# Patient Record
Sex: FEMALE | Race: WHITE
[De-identification: names, ages, dates, MRNs, and addresses within clinical notes are randomized per-mention and may not be internally consistent; named-entity substitution may affect disease eponyms.]

---

## 2018-03-12 ENCOUNTER — HOSPITAL ENCOUNTER (OUTPATIENT)
Dept: HOSPITAL 80 - F3N | Age: 83
Setting detail: OBSERVATION
LOS: 4 days | Discharge: HOME HEALTH | End: 2018-03-16
Attending: ORTHOPAEDIC SURGERY | Admitting: ORTHOPAEDIC SURGERY
Payer: COMMERCIAL

## 2018-03-12 DIAGNOSIS — T84.033A: Primary | ICD-10-CM

## 2018-03-12 DIAGNOSIS — Z96.651: ICD-10-CM

## 2018-03-12 PROCEDURE — 97161 PT EVAL LOW COMPLEX 20 MIN: CPT

## 2018-03-12 PROCEDURE — 27487 REVISE/REPLACE KNEE JOINT: CPT

## 2018-03-12 PROCEDURE — 97110 THERAPEUTIC EXERCISES: CPT

## 2018-03-12 PROCEDURE — 73560 X-RAY EXAM OF KNEE 1 OR 2: CPT

## 2018-03-12 PROCEDURE — 97165 OT EVAL LOW COMPLEX 30 MIN: CPT

## 2018-03-12 PROCEDURE — C1713 ANCHOR/SCREW BN/BN,TIS/BN: HCPCS

## 2018-03-15 PROCEDURE — 0SRD0J9 REPLACEMENT OF LEFT KNEE JOINT WITH SYNTHETIC SUBSTITUTE, CEMENTED, OPEN APPROACH: ICD-10-PCS | Performed by: ORTHOPAEDIC SURGERY

## 2018-03-15 PROCEDURE — 0SPD0JZ REMOVAL OF SYNTHETIC SUBSTITUTE FROM LEFT KNEE JOINT, OPEN APPROACH: ICD-10-PCS | Performed by: ORTHOPAEDIC SURGERY

## 2018-03-15 RX ADMIN — ACETAMINOPHEN SCH MG: 325 TABLET ORAL at 16:59

## 2018-03-15 RX ADMIN — TRANEXAMIC ACID SCH MG: 650 TABLET ORAL at 21:02

## 2018-03-15 RX ADMIN — OXYCODONE HYDROCHLORIDE PRN MG: 15 TABLET ORAL at 18:32

## 2018-03-15 RX ADMIN — OXYCODONE HYDROCHLORIDE PRN MG: 15 TABLET ORAL at 17:01

## 2018-03-15 RX ADMIN — Medication SCH MLS: at 21:03

## 2018-03-15 RX ADMIN — FAMOTIDINE SCH MG: 20 TABLET, FILM COATED ORAL at 17:01

## 2018-03-15 RX ADMIN — DOCUSATE SODIUM AND SENNOSIDES SCH TAB: 50; 8.6 TABLET ORAL at 21:02

## 2018-03-15 NOTE — PDANEPAE
ANE History of Present Illness





LTKA





ANE Past Medical History





- Cardiovascular History


Hx Hypertension: No


Hx Arrhythmias: No


Hx Chest Pain: No


Hx Coronary Artery / Peripheral Vascular Disease: No


Hx CHF / Valvular Disease: No


Hx Palpitations: No





- Pulmonary History


Hx COPD: No


Hx Asthma/Reactive Airway Disease: No


Hx Recent Upper Respiratory Infection: No


Hx Oxygen in Use at Home: No


Hx Sleep Apnea: No


Sleep Apnea Screening Result - Last Documented: Negative





- Neurologic History


Hx Cerebrovascular Accident: No


Hx Seizures: No


Hx Dementia: No





- Endocrine History


Hx Diabetes: No





- Renal History


Hx Renal Disorders: No





- Liver History


Hx Hepatic Disorders: No





- Neurological & Psychiatric Hx


Hx Neurological and Psychiatric Disorders: No





- Cancer History


Hx Cancer: No





- Congenital Disorder History


Hx Congenital Disorders: No





- GI History


Hx Gastrointestinal Disorders: Yes


Gastrointestinal History Comment: hx of colonoscopies.  occ constipation





- Other Health History


Other Health History: wears glasses/ contacts.  wears hearing aides bilaterally





- Chronic Pain History


Chronic Pain: Yes (left knee, occ back pain)





- Surgical History


Prior Surgeries: appy.  previous left TKA in 2008.  right tka 2006.  bunion/ 

neuroma surgery bilateral 1997.  ligament repair to foot 2000.  colonoscopy





ANE Review of Systems


Review of Systems: 








- Exercise capacity


METS (RN): 4 METS





ANE Patient History





- Allergies


Allergies/Adverse Reactions: 








morphine Allergy (Verified 02/08/18 10:20)


 nausea and weakness








- Home Medications


Home Medications: 








Ferrous Sulfate [Ferrous Sulf 325 MG (*)] 325 mg PO DAILY 02/01/18 [Last Taken 

Unknown]


Herbals/Supplements -Info Only 1 ea PO DAILY 02/01/18 [Last Taken Unknown]


Magnesium Oxide [Magnesium Oxide 400 mg (*)] 400 mg PO DAILY 02/01/18 [Last 

Taken Unknown]


Naproxen Sodium [Aleve 220 MG (*)] 220 mg PO DAILY PRN 02/01/18 [Last Taken 

Unknown]


Tetrahydrozoline 0.05% [Visine (*)] 1 drop EACHEYE DAILY PRN 02/01/18 [Last 

Taken Unknown]








- NPO status


NPO Since - Liquids (Date): 03/14/18


NPO Since - Liquids (Time): 22:00


NPO Since - Solids (Date): 03/14/18


NPO Since - Solids (Time): 17:30





- Smoking Hx


Smoking Status: Never smoked





- Family Anes Hx


Family Hx Anesthesia Complications: none





ANE Labs/Vital Signs





- Vital Signs


Blood Pressure: 145/82


Heart Rate: 56


Respiratory Rate: 14


O2 Sat (%): 95


Height: 162.56 cm


Weight: 65.77 kg





ANE Anesthesia Plan


Anesthesia Plan: spinal


Total IV Anesthesia: Yes

## 2018-03-15 NOTE — PDANEPAE
ANE History of Present Illness





L TKA





ANE Past Medical History





- Cardiovascular History


Hx Hypertension: No


Hx Arrhythmias: No


Hx Chest Pain: No


Hx Coronary Artery / Peripheral Vascular Disease: No


Hx CHF / Valvular Disease: No


Hx Palpitations: No





- Pulmonary History


Hx COPD: No


Hx Asthma/Reactive Airway Disease: No


Hx Recent Upper Respiratory Infection: No


Hx Oxygen in Use at Home: No


Hx Sleep Apnea: No


Sleep Apnea Screening Result - Last Documented: Negative





- Neurologic History


Hx Cerebrovascular Accident: No


Hx Seizures: No


Hx Dementia: No





- Endocrine History


Hx Diabetes: No





- Renal History


Hx Renal Disorders: No





- Liver History


Hx Hepatic Disorders: No





- Neurological & Psychiatric Hx


Hx Neurological and Psychiatric Disorders: No





- Cancer History


Hx Cancer: No





- Congenital Disorder History


Hx Congenital Disorders: No





- GI History


Hx Gastrointestinal Disorders: Yes


Gastrointestinal History Comment: hx of colonoscopies.  occ constipation





- Other Health History


Other Health History: wears glasses/ contacts.  wears hearing aides bilaterally





- Chronic Pain History


Chronic Pain: Yes (left knee, occ back pain)





- Surgical History


Prior Surgeries: appy.  previous left TKA in 2008.  right tka 2006.  bunion/ 

neuroma surgery bilateral 1997.  ligament repair to foot 2000.  colonoscopy





ANE Review of Systems


Review of systems is: negative


Review of Systems: 








- Exercise capacity


METS (RN): 4 METS





ANE Patient History





- Allergies


Allergies/Adverse Reactions: 








morphine Allergy (Verified 02/08/18 10:20)


 nausea and weakness








- Home Medications


Home medications: home medication list seen and reviewed


Home Medications: 








Ferrous Sulfate [Ferrous Sulf 325 MG (*)] 325 mg PO DAILY 02/01/18 [Last Taken 

Unknown]


Herbals/Supplements -Info Only 1 ea PO DAILY 02/01/18 [Last Taken Unknown]


Magnesium Oxide [Magnesium Oxide 400 mg (*)] 400 mg PO DAILY 02/01/18 [Last 

Taken Unknown]


Naproxen Sodium [Aleve 220 MG (*)] 220 mg PO DAILY PRN 02/01/18 [Last Taken 

Unknown]


Tetrahydrozoline 0.05% [Visine (*)] 1 drop EACHEYE DAILY PRN 02/01/18 [Last 

Taken Unknown]








- Anes Hx


Anes Hx: post operative nausea





- Smoking Hx


Smoking Status: Never smoked





- Family Anes Hx


Family Anes Hx: none


Family Hx Anesthesia Complications: none





ANE Labs/Vital Signs





- Vital Signs


Blood Pressure: 145/82


Heart Rate: 56


Respiratory Rate: 14


O2 Sat (%): 95


Height: 162.56 cm


Weight: 65.77 kg





ANE Physical Exam





- Airway


Neck exam: FROM


Mallampati Score: Class 2


Mouth exam: normal dental/mouth exam





- Pulmonary


Pulmonary: no respiratory distress





- Cardiovascular


Cardiovascular: regular rate and rhythym





- ASA Status


ASA Status: II





ANE Anesthesia Plan


Anesthesia Plan: GA w LMA

## 2018-03-16 VITALS
TEMPERATURE: 97.7 F | HEART RATE: 76 BPM | DIASTOLIC BLOOD PRESSURE: 75 MMHG | SYSTOLIC BLOOD PRESSURE: 140 MMHG | OXYGEN SATURATION: 93 %

## 2018-03-16 VITALS — RESPIRATION RATE: 16 BRPM

## 2018-03-16 RX ADMIN — ASPIRIN SCH MG: 325 TABLET, FILM COATED ORAL at 09:38

## 2018-03-16 RX ADMIN — FAMOTIDINE SCH MG: 20 TABLET, FILM COATED ORAL at 09:38

## 2018-03-16 RX ADMIN — Medication SCH MLS: at 04:37

## 2018-03-16 RX ADMIN — ACETAMINOPHEN SCH: 325 TABLET ORAL at 04:38

## 2018-03-16 RX ADMIN — ACETAMINOPHEN SCH MG: 325 TABLET ORAL at 04:38

## 2018-03-16 RX ADMIN — TRANEXAMIC ACID SCH MG: 650 TABLET ORAL at 12:24

## 2018-03-16 RX ADMIN — TRANEXAMIC ACID SCH MG: 650 TABLET ORAL at 04:39

## 2018-03-16 RX ADMIN — ACETAMINOPHEN SCH MG: 325 TABLET ORAL at 12:23

## 2018-03-16 RX ADMIN — OXYCODONE HYDROCHLORIDE PRN MG: 15 TABLET ORAL at 09:53

## 2018-03-16 RX ADMIN — DOCUSATE SODIUM AND SENNOSIDES SCH TAB: 50; 8.6 TABLET ORAL at 09:38

## 2018-03-16 RX ADMIN — ASPIRIN SCH: 325 TABLET, FILM COATED ORAL at 03:52

## 2018-03-16 NOTE — PDIAF
- Diagnosis


Diagnosis: s/p tka revision


Code Status: Full Code





- Medication Management


Discharge Medications: 


 Medications to Continue on Transfer





Ferrous Sulfate [Ferrous Sulf 325 MG (*)] 325 mg PO DAILY 02/01/18 [Last Taken 

Unknown]


Herbals/Supplements -Info Only 1 ea PO DAILY 02/01/18 [Last Taken Unknown]


Magnesium Oxide [Magnesium Oxide 400 mg (*)] 400 mg PO DAILY 02/01/18 [Last 

Taken Unknown]


Tetrahydrozoline 0.05% [Visine (*)] 1 drop EACHEYE DAILY PRN 02/01/18 [Last 

Taken Unknown]


Aspirin [Aspirin 325 mg (*)] 325 mg PO DAILY  tab 03/16/18 [Last Taken Unknown]


Promethazine HCl [Phenergan Rectal] 25 mg [place] Q6HRS PRN #10 suppr 03/16/18 [Last 

Taken Unknown]


oxyCODONE IR [Oxycodone Ir (*)] 5 - 10 mg PO Q3HRS PRN #40 tab 03/16/18 [Last 

Taken Unknown]








Discharge Medications: Refer to the Discharge Home Medication list for PRN 

reason.





- Orders


Services needed: Physical Therapy


Diet Recommendation: no restrictions on diet


Diet Texture: Regular Texture Diet


Activity/Weight Bearing Restrictions: wbat.  rom as hugo.  daily dressing 

changes.  no soaking or immersion.  may shower without bandage.  f/u at two 

weeks [facility].  seek attn for increasing pain, redness, drainage or other 

focal complaint





- Follow Up Care


Current Providers and Referrals: 


DONATO RING [Other]


Escobar Edwards MD [Medical Doctor] -

## 2018-03-16 NOTE — PDIAF
- Diagnosis


Diagnosis: s/p tka revision


Code Status: Full Code





- Medication Management


Discharge Medications: 


 Medications to Continue on Transfer





Ferrous Sulfate [Ferrous Sulf 325 MG (*)] 325 mg PO DAILY 02/01/18 [Last Taken 

Unknown]


Herbals/Supplements -Info Only 1 ea PO DAILY 02/01/18 [Last Taken Unknown]


Magnesium Oxide [Magnesium Oxide 400 mg (*)] 400 mg PO DAILY 02/01/18 [Last 

Taken Unknown]


Naproxen Sodium [Aleve 220 MG (*)] 220 mg PO DAILY PRN 02/01/18 [Last Taken 

Unknown]


Tetrahydrozoline 0.05% [Visine (*)] 1 drop EACHEYE DAILY PRN 02/01/18 [Last 

Taken Unknown]








Discharge Medications: Refer to the Discharge Home Medication list for PRN 

reason.





- Orders


Services needed: Physical Therapy


Diet Recommendation: no restrictions on diet


Diet Texture: Regular Texture Diet


Activity/Weight Bearing Restrictions: wbat.  rom as hugo.  daily dressing 

changes.  no soaking or immersion.  may shower without bandage.  f/u at two 

weeks [facility].  seek attn for increasing pain, redness, drainage or other 

focal complaint





- Follow Up Care


Current Providers and Referrals: 


DONATO RING [Other]


Escobar Edwards MD [Medical Doctor] -

## 2018-03-16 NOTE — ASMTCMCOM
CM Note

 

CM Note                       

Notes:

Pt medically stable for d/c with [facility]. Pt originally requested [facility] but they 

report they do not services Drain. Orders sent in [employer]. 

 

Date Signed:  03/16/2018 02:14 PM

Electronically Signed By:GIANNI Tong

## 2018-03-16 NOTE — ASDISCHSUM
----------------------------------------------

Discharge Information

----------------------------------------------

Plan Status:Home with Home Health                    Medically Cleared to Leave:

Discharge Date:03/16/2018 02:04 PM                   CM D/C Disposition:Home Health Service

ADT D/C Disposition:Home Health Service              Projected Discharge Date:03/16/2018 11:00 AM

Transportation at D/C:Family                         Discharge Delay Reason:

Follow-Up Date:03/16/2018 11:00 AM                   Discharge Slot:

Final Diagnosis:

----------------------------------------------

Placement Information

----------------------------------------------

Referral Type:*Home Health Care Services             Referral ID:[id number]

Provider Name:[facility]

Address 1:[address] [place] [address]             Phone [phone]

Address 2:                                           Fax [phone]

City:[place]                                          Selection Factors:

State:[place]

 

----------------------------------------------

Patient Contact Information

----------------------------------------------

Contact Name:NEO                            Relationship:

Address:[address]                              Home [phone]

                                                     Work [phone]

City:[place]                                       Alternate Phone:

State/Zip Code:[place] [address]                              Email:

----------------------------------------------

Financial Information

----------------------------------------------

Financial Class:Medicare

Primary Plan Desc:MEDICARE OUTPATIENT                Primary Plan Number:[id number]

Secondary Plan Desc:[employer]/M[employer] SUPPLEMENT              Secondary Plan Number:[id number]

 

 

----------------------------------------------

Assessment Information

----------------------------------------------

----------------------------------------------

[facility] CM Progress Note

----------------------------------------------

CM Note

 

CM Note                       

Notes:

Pt medically stable for d/c with [facility]. Pt originally requested [facility] but they 

report they do not services [place]. Orders sent in [employer]. 

 

Date Signed:  03/16/2018 02:14 PM

Electronically Signed By:GIANNI Tong

 

 

----------------------------------------------

Intervention Information

----------------------------------------------

## 2018-03-16 NOTE — SOAPPROG
SOAP Progress Note


Assessment/Plan: 


Assessment:








s/p left tka revision




















Plan:


wbat


rom as hugo


dvt precautions


d/c once cleared by pt





03/16/18 07:14





Subjective: 





no co


hugo po


no current nausea





Objective: 





 Vital Signs











Temp Pulse Resp BP Pulse Ox


 


 36.5 C   63   16   123/66 H  97 


 


 03/16/18 04:00  03/16/18 04:00  03/16/18 04:00  03/16/18 04:00  03/16/18 04:00








 Microbiology











 03/15/18 12:30 Gram Stain - Final





 Knee - Eswab 








 Laboratory Results





 03/16/18 04:40 





 











 03/15/18 03/16/18 03/17/18





 05:59 05:59 05:59


 


Intake Total  1555 


 


Output Total  1450 


 


Balance  105 








dressing intact


iintact pf,df,ehl


toes warm and pink


no compartment syndrome


muscular compartments soft nt,nd


neg homans mami


xrays stable anatomic alignment


small fx at prox lat tibial cortex








ICD10 Worksheet


Patient Problems: 


 Problems











Problem Status Onset


 


Knee joint replacement by other means Acute  














- ICD10 Problem Qualifiers


(1) Knee joint replacement by other means

## 2018-05-02 ENCOUNTER — HOSPITAL ENCOUNTER (OUTPATIENT)
Dept: HOSPITAL 80 - BMCIMAGING | Age: 83
End: 2018-05-02
Attending: PHYSICIAN ASSISTANT
Payer: COMMERCIAL

## 2018-05-02 DIAGNOSIS — Z96.652: ICD-10-CM

## 2018-05-02 DIAGNOSIS — Z47.1: Primary | ICD-10-CM

## 2018-06-13 ENCOUNTER — HOSPITAL ENCOUNTER (OUTPATIENT)
Dept: HOSPITAL 80 - BMCIMAGING | Age: 83
End: 2018-06-13
Attending: ORTHOPAEDIC SURGERY
Payer: COMMERCIAL

## 2018-06-13 DIAGNOSIS — Z47.1: Primary | ICD-10-CM

## 2018-06-13 DIAGNOSIS — S82.292D: ICD-10-CM

## 2018-06-13 DIAGNOSIS — Z96.652: ICD-10-CM

## 2018-09-13 ENCOUNTER — HOSPITAL ENCOUNTER (OUTPATIENT)
Dept: HOSPITAL 80 - BMCIMAGING | Age: 83
End: 2018-09-13
Attending: ORTHOPAEDIC SURGERY
Payer: COMMERCIAL

## 2018-09-13 DIAGNOSIS — Z96.652: ICD-10-CM

## 2018-09-13 DIAGNOSIS — Z47.1: Primary | ICD-10-CM

## 2019-03-18 ENCOUNTER — HOSPITAL ENCOUNTER (OUTPATIENT)
Dept: HOSPITAL 80 - BMCIMAGING | Age: 84
End: 2019-03-18
Attending: ORTHOPAEDIC SURGERY
Payer: COMMERCIAL

## 2019-03-18 DIAGNOSIS — Z47.1: Primary | ICD-10-CM

## 2019-03-18 DIAGNOSIS — Z96.652: ICD-10-CM
